# Patient Record
Sex: FEMALE | Race: BLACK OR AFRICAN AMERICAN
[De-identification: names, ages, dates, MRNs, and addresses within clinical notes are randomized per-mention and may not be internally consistent; named-entity substitution may affect disease eponyms.]

---

## 2017-05-04 ENCOUNTER — HOSPITAL ENCOUNTER (OUTPATIENT)
Dept: HOSPITAL 61 - KCIC | Age: 57
Discharge: HOME | End: 2017-05-04
Attending: INTERNAL MEDICINE
Payer: COMMERCIAL

## 2017-05-04 DIAGNOSIS — M25.572: Primary | ICD-10-CM

## 2017-05-04 PROCEDURE — 73610 X-RAY EXAM OF ANKLE: CPT

## 2017-05-04 PROCEDURE — 73630 X-RAY EXAM OF FOOT: CPT

## 2017-05-04 NOTE — [facility]
PROCEDURE 

Left ankle, three views 

 

Left foot, three views 

 

HISTORY 

Left foot and ankle pain, no known injury. 

 

COMPARISON 

None. 

 

FINDINGS 

Ankle mortise is intact. No soft tissue swelling is seen. No joint 

effusion. Mineralization appears normal. No acute fracture. 

No soft tissue swelling of the foot. No acute fracture or dislocation of 

the foot. Minimal joint space narrowing 1st MTP. No bony erosion. 

 

IMPRESSION 

No acute bone abnormality. 

 

 

Electronically signed by: Marvin Longoria MD (May 04, 2017 11:29:15)

## 2018-12-13 ENCOUNTER — HOSPITAL ENCOUNTER (EMERGENCY)
Dept: HOSPITAL 61 - ER | Age: 58
Discharge: HOME | End: 2018-12-13
Payer: COMMERCIAL

## 2018-12-13 VITALS — BODY MASS INDEX: 29.64 KG/M2 | WEIGHT: 147 LBS | HEIGHT: 59 IN

## 2018-12-13 VITALS — SYSTOLIC BLOOD PRESSURE: 144 MMHG | DIASTOLIC BLOOD PRESSURE: 82 MMHG

## 2018-12-13 DIAGNOSIS — R11.2: Primary | ICD-10-CM

## 2018-12-13 DIAGNOSIS — F43.10: ICD-10-CM

## 2018-12-13 DIAGNOSIS — I10: ICD-10-CM

## 2018-12-13 DIAGNOSIS — R10.13: ICD-10-CM

## 2018-12-13 DIAGNOSIS — R51: ICD-10-CM

## 2018-12-13 DIAGNOSIS — R19.7: ICD-10-CM

## 2018-12-13 DIAGNOSIS — F17.210: ICD-10-CM

## 2018-12-13 LAB
ALBUMIN SERPL-MCNC: 4.2 G/DL (ref 3.4–5)
ALBUMIN/GLOB SERPL: 1 {RATIO} (ref 1–1.7)
ALP SERPL-CCNC: 89 U/L (ref 46–116)
ALT SERPL-CCNC: 32 U/L (ref 14–59)
ANION GAP SERPL CALC-SCNC: 11 MMOL/L (ref 6–14)
APTT PPP: (no result) S
AST SERPL-CCNC: 25 U/L (ref 15–37)
BACTERIA #/AREA URNS HPF: 0 /HPF
BASOPHILS # BLD AUTO: 0.1 X10^3/UL (ref 0–0.2)
BASOPHILS NFR BLD: 1 % (ref 0–3)
BILIRUB SERPL-MCNC: 0.6 MG/DL (ref 0.2–1)
BILIRUB UR QL STRIP: (no result)
BUN SERPL-MCNC: 26 MG/DL (ref 7–20)
BUN/CREAT SERPL: 20 (ref 6–20)
CALCIUM SERPL-MCNC: 9.6 MG/DL (ref 8.5–10.1)
CHLORIDE SERPL-SCNC: 101 MMOL/L (ref 98–107)
CO2 SERPL-SCNC: 25 MMOL/L (ref 21–32)
CREAT SERPL-MCNC: 1.3 MG/DL (ref 0.6–1)
EOSINOPHIL NFR BLD: 0.5 X10^3/UL (ref 0–0.7)
EOSINOPHIL NFR BLD: 5 % (ref 0–3)
ERYTHROCYTE [DISTWIDTH] IN BLOOD BY AUTOMATED COUNT: 15.8 % (ref 11.5–14.5)
FIBRINOGEN PPP-MCNC: CLEAR MG/DL
GFR SERPLBLD BASED ON 1.73 SQ M-ARVRAT: 50.7 ML/MIN
GLOBULIN SER-MCNC: 4.4 G/DL (ref 2.2–3.8)
GLUCOSE SERPL-MCNC: 111 MG/DL (ref 70–99)
HCT VFR BLD CALC: 42.5 % (ref 36–47)
HGB BLD-MCNC: 14.3 G/DL (ref 12–15.5)
LIPASE: 61 U/L (ref 73–393)
LYMPHOCYTES # BLD: 1.3 X10^3/UL (ref 1–4.8)
LYMPHOCYTES NFR BLD AUTO: 13 % (ref 24–48)
MCH RBC QN AUTO: 25 PG (ref 25–35)
MCHC RBC AUTO-ENTMCNC: 34 G/DL (ref 31–37)
MCV RBC AUTO: 73 FL (ref 79–100)
MONO #: 0.6 X10^3/UL (ref 0–1.1)
MONOCYTES NFR BLD: 6 % (ref 0–9)
NEUT #: 7.5 X10^3UL (ref 1.8–7.7)
NEUTROPHILS NFR BLD AUTO: 75 % (ref 31–73)
NITRITE UR QL STRIP: NEGATIVE
PH UR STRIP: 5 [PH]
PLATELET # BLD AUTO: 360 X10^3/UL (ref 140–400)
POTASSIUM SERPL-SCNC: 3.8 MMOL/L (ref 3.5–5.1)
PROT SERPL-MCNC: 8.6 G/DL (ref 6.4–8.2)
PROT UR STRIP-MCNC: NEGATIVE MG/DL
RBC # BLD AUTO: 5.84 X10^6/UL (ref 3.5–5.4)
RBC #/AREA URNS HPF: 0 /HPF (ref 0–2)
SODIUM SERPL-SCNC: 137 MMOL/L (ref 136–145)
SQUAMOUS #/AREA URNS LPF: (no result) /LPF
UROBILINOGEN UR-MCNC: 0.2 MG/DL
WBC # BLD AUTO: 10 X10^3/UL (ref 4–11)
WBC #/AREA URNS HPF: 0 /HPF (ref 0–4)

## 2018-12-13 PROCEDURE — 36415 COLL VENOUS BLD VENIPUNCTURE: CPT

## 2018-12-13 PROCEDURE — 85025 COMPLETE CBC W/AUTO DIFF WBC: CPT

## 2018-12-13 PROCEDURE — 96361 HYDRATE IV INFUSION ADD-ON: CPT

## 2018-12-13 PROCEDURE — 80053 COMPREHEN METABOLIC PANEL: CPT

## 2018-12-13 PROCEDURE — 70450 CT HEAD/BRAIN W/O DYE: CPT

## 2018-12-13 PROCEDURE — 96374 THER/PROPH/DIAG INJ IV PUSH: CPT

## 2018-12-13 PROCEDURE — 99284 EMERGENCY DEPT VISIT MOD MDM: CPT

## 2018-12-13 PROCEDURE — 96375 TX/PRO/DX INJ NEW DRUG ADDON: CPT

## 2018-12-13 PROCEDURE — 83690 ASSAY OF LIPASE: CPT

## 2018-12-13 PROCEDURE — 93005 ELECTROCARDIOGRAM TRACING: CPT

## 2018-12-13 PROCEDURE — 84484 ASSAY OF TROPONIN QUANT: CPT

## 2018-12-13 PROCEDURE — 81001 URINALYSIS AUTO W/SCOPE: CPT

## 2018-12-13 NOTE — RAD
PQRS Compliance statement:

 

One or more of the following individualized dose reduction techniques were

utilized for this examination:

1. Automated exposure control.

2. Adjustment of the mA and/or kV according to patient size.

3. Use of iterative reconstruction technique.

 

Indication:HA X 1 MONTH, NO PRIORS

 

TECHNIQUE: CT head without IV contrast

 

COMPARISON:None

 

FINDINGS:

No pathologic extra-axial or intra-axial fluid collection. The ventricles 

and basal cisterns are within normal limits. No acute intracranial bleed. 

No focal loss of gray-white differentiation. Orbits within normal limits. 

No suspicious calvarial lesion. Visualized paranasal sinuses and mastoid 

air cells are clear.

 

IMPRESSION: No acute intracranial process.

 

Electronically signed by: Christ Estrada DO (12/13/2018 12:30 PM) [facility]

## 2018-12-13 NOTE — EKG
[facility]

              [address] [place] [address]

Test Date:    2018               Test Time:    11:12:51

Pat Name:     DOMINICK ROJAS            Department:   

Patient ID:   [id number]           Room:          

Gender:       F                        Technician:   

:          1960               Requested By: ALO ARIZMENDI

Order Number: [id number]           Reading MD:     

                                 Measurements

Intervals                              Axis          

Rate:         91                       P:            64

AR:           160                      QRS:          36

QRSD:         88                       T:            27

QT:           364                                    

QTc:          449                                    

                           Interpretive Statements

SINUS RHYTHM

QRS(T) CONTOUR ABNORMALITY

CONSIDER ANTEROSEPTAL MYOCARDIAL DAMAGE

POSSIBLY ABNORMAL ECG

RI6.01

No previous ECG available for comparison

## 2018-12-13 NOTE — PHYS DOC
Past Medical History


Past Medical History:  Anxiety, Depression, Hypertension


Additional Past Medical Histor:  PTSD


Additional Past Surgical Histo:  TUBAL PREGNANCIES X 2


Additional Information:  


8-9 CIGS/DAY


Alcohol Use:  None


Drug Use:  None





Adult General


Chief Complaint


Chief Complaint:  ABDOMINAL PAIN





HPI


HPI





Patient is a 58  year old female who presents with nausea, vomiting, diarrhea 

and aching epigastric abd. Patient reports that 1-2 hours after lunch yesterday

, which she ate stew, she began to experience these Sx. These Sx have been 

persistent since onset. She has been unable to eat and only drank a small bit 

coffee today for fluids. She denies hematemesis and hematochezia. She has never 

experienced this pain in the past. PSHx of 2 tubal pregnancies in 2003 and 

appendectomy when a child. She is a 1/2 PPD smoker. Denies EtOH and 

recreational drugs. Denies recent travel or sick contacts. Denies dysuria, cough

, CP, fevers, and chills. 





In addition patient has had 3 weeks of a fairly severe headache worsening the 

morning before she gets up from bed. She says it is frontal throbbing slowly 

getting worse no numbness tingling no visual changes.





Review of Systems


Review of Systems





Constitutional: Denies fever or chills []


Eyes: Denies change in visual acuity, redness, or eye pain []


HENT: Denies nasal congestion or sore throat []


Respiratory: Denies cough or shortness of breath []


Cardiovascular: No additional information not addressed in HPI []


GI: + abd pain, + nausea, + vomiting, + diarrhea. Denies hematemesis and 

hematochezia. 


: Denies dysuria or hematuria []


Musculoskeletal: Denies back pain or joint pain []


Integument: Denies rash or skin lesions []


Neurologic: Denies headache, focal weakness or sensory changes []


Endocrine: Denies polyuria or polydipsia []





All other systems were reviewed and found to be within normal limits, except as 

documented in this note.





Current Medications


Current Medications





Current Medications








 Medications


  (Trade)  Dose


 Ordered  Sig/Dominga  Start Time


 Stop Time Status Last Admin


Dose Admin


 


 Fentanyl Citrate


  (Fentanyl 2ml


 Vial)  50 mcg  1X  ONCE  12/13/18 11:00


 12/13/18 11:14 DC 12/13/18 11:22


50 MCG


 


 Ondansetron HCl


  (Zofran)  4 mg  1X  ONCE  12/13/18 11:00


 12/13/18 11:14 DC 12/13/18 11:22


4 MG


 


 Prochlorperazine


 Edisylate


  (Compazine)  10 mg  1X  ONCE  12/13/18 11:45


 12/13/18 11:47 DC 12/13/18 12:35


10 MG


 


 Sodium Chloride  1,000 ml @ 


 1,000 mls/hr  1X  ONCE  12/13/18 11:00


 12/13/18 11:59 DC 12/13/18 11:23


1,000 MLS/HR











Allergies


Allergies





Allergies








Coded Allergies Type Severity Reaction Last Updated Verified


 


  No Known Drug Allergies    12/13/18 No











Physical Exam


Physical Exam





Constitutional: Well developed, well nourished, no acute distress, non-toxic 

appearance. []


HENT: Normocephalic, atraumatic, bilateral external ears normal, oropharynx 

moist, no oral exudates, nose normal. []


Eyes: PERRLA, EOMI, conjunctiva normal, no discharge. [] 


Neck: Normal range of motion, no tenderness, supple, no stridor. [] 


Cardiovascular:Heart rate regular rhythm, no murmur []


Lungs & Thorax:  Bilateral breath sounds clear to auscultation []


Abdomen: Mild diffuse abd TTP that is worse in the epigastrium. No rebound, 

rigidity or guarding. Bowel sounds normal, soft, no masses, no pulsatile 

masses. 


Skin: Warm, dry, no erythema, no rash. [] 


Back: No tenderness, no CVA tenderness. [] 


Extremities: No tenderness, no cyanosis, no clubbing, ROM intact, no edema. [] 


Neurologic: Alert and oriented X 3, normal motor function, normal sensory 

function, no focal deficits noted. []


Psychologic: Affect normal, judgement normal, mood normal. []





Current Patient Data


Vital Signs





 Vital Signs








  Date Time  Temp Pulse Resp B/P (MAP) Pulse Ox O2 Delivery O2 Flow Rate FiO2


 


12/13/18 12:30  80  144/82 (102) 95 Room Air  


 


12/13/18 11:22   18     


 


12/13/18 10:22 98.8       





 98.8       








Lab Values





 Laboratory Tests








Test


 12/13/18


09:47


 


White Blood Count


 10.0 x10^3/uL


(4.0-11.0)


 


Red Blood Count


 5.84 x10^6/uL


(3.50-5.40)  H


 


Hemoglobin


 14.3 g/dL


(12.0-15.5)


 


Hematocrit


 42.5 %


(36.0-47.0)


 


Mean Corpuscular Volume


 73 fL ()


L


 


Mean Corpuscular Hemoglobin 25 pg (25-35)  


 


Mean Corpuscular Hemoglobin


Concent 34 g/dL


(31-37)


 


Red Cell Distribution Width


 15.8 %


(11.5-14.5)  H


 


Platelet Count


 360 x10^3/uL


(140-400)


 


Neutrophils (%) (Auto) 75 % (31-73)  H


 


Lymphocytes (%) (Auto) 13 % (24-48)  L


 


Monocytes (%) (Auto) 6 % (0-9)  


 


Eosinophils (%) (Auto) 5 % (0-3)  H


 


Basophils (%) (Auto) 1 % (0-3)  


 


Neutrophils # (Auto)


 7.5 x10^3uL


(1.8-7.7)


 


Lymphocytes # (Auto)


 1.3 x10^3/uL


(1.0-4.8)


 


Monocytes # (Auto)


 0.6 x10^3/uL


(0.0-1.1)


 


Eosinophils # (Auto)


 0.5 x10^3/uL


(0.0-0.7)


 


Basophils # (Auto)


 0.1 x10^3/uL


(0.0-0.2)


 


Urine Color Leny  


 


Urine Clarity Clear  


 


Urine pH 5.0  


 


Urine Specific Gravity 1.025  


 


Urine Protein


 Negative mg/dL


(NEG-TRACE)


 


Urine Glucose (UA)


 Negative mg/dL


(NEG)


 


Urine Ketones (Stick)


 Negative mg/dL


(NEG)


 


Urine Blood


 Negative (NEG)





 


Urine Nitrite


 Negative (NEG)





 


Urine Bilirubin Small (NEG)  


 


Urine Urobilinogen Dipstick


 0.2 mg/dL (0.2


mg/dL)


 


Urine Leukocyte Esterase


 Negative (NEG)





 


Urine RBC 0 /HPF (0-2)  


 


Urine WBC 0 /HPF (0-4)  


 


Urine Squamous Epithelial


Cells Few /LPF  





 


Urine Bacteria


 0 /HPF (0-FEW)





 


Sodium Level


 137 mmol/L


(136-145)


 


Potassium Level


 3.8 mmol/L


(3.5-5.1)


 


Chloride Level


 101 mmol/L


()


 


Carbon Dioxide Level


 25 mmol/L


(21-32)


 


Anion Gap 11 (6-14)  


 


Blood Urea Nitrogen


 26 mg/dL


(7-20)  H


 


Creatinine


 1.3 mg/dL


(0.6-1.0)  H


 


Estimated GFR


(Cockcroft-Gault) 50.7  





 


BUN/Creatinine Ratio 20 (6-20)  


 


Glucose Level


 111 mg/dL


(70-99)  H


 


Calcium Level


 9.6 mg/dL


(8.5-10.1)


 


Total Bilirubin


 0.6 mg/dL


(0.2-1.0)


 


Aspartate Amino Transferase


(AST) 25 U/L (15-37)





 


Alanine Aminotransferase (ALT)


 32 U/L (14-59)





 


Alkaline Phosphatase


 89 U/L


()


 


Troponin I Quantitative


 < 0.017 ng/mL


(0.000-0.055)


 


Total Protein


 8.6 g/dL


(6.4-8.2)  H


 


Albumin


 4.2 g/dL


(3.4-5.0)


 


Albumin/Globulin Ratio 1.0 (1.0-1.7)  


 


Lipase


 61 U/L


()  L





 Laboratory Tests


12/13/18 09:47








 Laboratory Tests


12/13/18 09:47











EKG


EKG


[]


Interpretation Time:


EKG shows a normal sinus rhythm at a rate of 91 there is probably LVH pattern 

there is nonspecific ST changes lateral no definite ST elevation was 

identified. Interpreted by me time of encounter





Radiology/Procedures


Radiology/Procedures


[]


Impressions:





Indication:HA X 1 MONTH, NO PRIORS


 


TECHNIQUE: CT head without IV contrast


 


COMPARISON:None


 


FINDINGS:


No pathologic extra-axial or intra-axial fluid collection. The ventricles 


and basal cisterns are within normal limits. No acute intracranial bleed. 


No focal loss of gray-white differentiation. Orbits within normal limits. 


No suspicious calvarial lesion. Visualized paranasal sinuses and mastoid 


air cells are clear.


 


IMPRESSION: No acute intracranial process.


 


Electronically signed by: Christ Ballard DO (12/13/2018 12:30 PM) [facility]














Course & Med Decision Making


Course & Med Decision Making


Pertinent Labs and Imaging studies reviewed. (See chart for details)





Assessment:


59 y/o female presents with nausea, vomiting, diarrhea and abd pain








Plan:


IV fluids


pain and nausea control


labs


UA


EKG








ER workup was essentially negative labs looked good she feels better after the 

above treatment CT head was performed due to positional headache for almost one 

month given her age that was negative. Patient was feeling better and was given 

a prescription for nausea medicine I suspect a viral etiology of her symptoms 

but she was given good return precautions. Her abdominal examination on 

reevaluation was benign nontender.





Dragon Disclaimer


Dragon Disclaimer


This electronic medical record was generated, in whole or in part, using a 

voice recognition dictation system.





Departure


Departure


Impression:  


 Primary Impression:  


 Nausea vomiting and diarrhea


Disposition:  01 HOME, SELF-CARE


Condition:  IMPROVED


Referrals:  


LAZARO BALLARD MD (PCP)


Scripts


Prochlorperazine Maleate (Compazine) 10 Mg Tablet


10 MG PO TID PRN for NAUSEA/VOMITING, #30 TAB


   Prov: ALO ARIZMENDI MD         12/13/18











ALO ARIZMENDI MD Dec 13, 2018 10:57

## 2021-03-28 ENCOUNTER — HOSPITAL ENCOUNTER (EMERGENCY)
Dept: HOSPITAL 61 - ER | Age: 61
Discharge: HOME | End: 2021-03-28
Payer: SELF-PAY

## 2021-03-28 VITALS — DIASTOLIC BLOOD PRESSURE: 92 MMHG | SYSTOLIC BLOOD PRESSURE: 179 MMHG

## 2021-03-28 VITALS — BODY MASS INDEX: 29.78 KG/M2 | WEIGHT: 147.71 LBS | HEIGHT: 59 IN

## 2021-03-28 DIAGNOSIS — F17.200: ICD-10-CM

## 2021-03-28 DIAGNOSIS — J02.9: Primary | ICD-10-CM

## 2021-03-28 DIAGNOSIS — F41.9: ICD-10-CM

## 2021-03-28 DIAGNOSIS — I10: ICD-10-CM

## 2021-03-28 DIAGNOSIS — Z98.890: ICD-10-CM

## 2021-03-28 DIAGNOSIS — R05: ICD-10-CM

## 2021-03-28 DIAGNOSIS — Z20.822: ICD-10-CM

## 2021-03-28 DIAGNOSIS — F32.9: ICD-10-CM

## 2021-03-28 LAB
% BANDS: 1 % (ref 0–9)
% LYMPHS: 30 % (ref 24–48)
% MONOS: 6 % (ref 0–10)
% SEGS: 56 % (ref 35–66)
ALBUMIN SERPL-MCNC: 4.3 G/DL (ref 3.4–5)
ALBUMIN/GLOB SERPL: 1 {RATIO} (ref 1–1.7)
ALP SERPL-CCNC: 100 U/L (ref 46–116)
ALT SERPL-CCNC: 14 U/L (ref 14–59)
ANION GAP SERPL CALC-SCNC: 11 MMOL/L (ref 6–14)
APTT PPP: YELLOW S
AST SERPL-CCNC: 12 U/L (ref 15–37)
BACTERIA #/AREA URNS HPF: 0 /HPF
BASOPHILS # BLD AUTO: 0.1 X10^3/UL (ref 0–0.2)
BASOPHILS NFR BLD AUTO: 1 % (ref 0–3)
BASOPHILS NFR BLD: 1 % (ref 0–3)
BILIRUB SERPL-MCNC: 0.7 MG/DL (ref 0.2–1)
BILIRUB UR QL STRIP: NEGATIVE
BUN SERPL-MCNC: 10 MG/DL (ref 7–20)
BUN/CREAT SERPL: 11 (ref 6–20)
CALCIUM SERPL-MCNC: 8.9 MG/DL (ref 8.5–10.1)
CHLORIDE SERPL-SCNC: 103 MMOL/L (ref 98–107)
CO2 SERPL-SCNC: 25 MMOL/L (ref 21–32)
CREAT SERPL-MCNC: 0.9 MG/DL (ref 0.6–1)
EOSINOPHIL NFR BLD AUTO: 6 % (ref 0–5)
EOSINOPHIL NFR BLD: 1.3 X10^3/UL (ref 0–0.7)
EOSINOPHIL NFR BLD: 9 % (ref 0–3)
ERYTHROCYTE [DISTWIDTH] IN BLOOD BY AUTOMATED COUNT: 15.1 % (ref 11.5–14.5)
FIBRINOGEN PPP-MCNC: CLEAR MG/DL
GFR SERPLBLD BASED ON 1.73 SQ M-ARVRAT: 77 ML/MIN
GLUCOSE SERPL-MCNC: 84 MG/DL (ref 70–99)
HCT VFR BLD CALC: 43.1 % (ref 36–47)
HGB BLD-MCNC: 14.1 G/DL (ref 12–15.5)
LYMPHOCYTES # BLD: 3.5 X10^3/UL (ref 1–4.8)
LYMPHOCYTES NFR BLD AUTO: 25 % (ref 24–48)
MAGNESIUM SERPL-MCNC: 2.2 MG/DL (ref 1.8–2.4)
MCH RBC QN AUTO: 24 PG (ref 25–35)
MCHC RBC AUTO-ENTMCNC: 33 G/DL (ref 31–37)
MCV RBC AUTO: 75 FL (ref 79–100)
MONO #: 0.8 X10^3/UL (ref 0–1.1)
MONOCYTES NFR BLD: 6 % (ref 0–9)
NEUT #: 8.3 X10^3/UL (ref 1.8–7.7)
NEUTROPHILS NFR BLD AUTO: 59 % (ref 31–73)
NITRITE UR QL STRIP: NEGATIVE
PH UR STRIP: 7 [PH]
PLATELET # BLD AUTO: 379 X10^3/UL (ref 140–400)
PLATELET # BLD EST: ADEQUATE 10*3/UL
POTASSIUM SERPL-SCNC: 3.7 MMOL/L (ref 3.5–5.1)
PROT SERPL-MCNC: 8.6 G/DL (ref 6.4–8.2)
PROT UR STRIP-MCNC: NEGATIVE MG/DL
RBC # BLD AUTO: 5.78 X10^6/UL (ref 3.5–5.4)
RBC #/AREA URNS HPF: (no result) /HPF (ref 0–2)
SODIUM SERPL-SCNC: 139 MMOL/L (ref 136–145)
UROBILINOGEN UR-MCNC: 0.2 MG/DL
WBC # BLD AUTO: 14.1 X10^3/UL (ref 4–11)
WBC #/AREA URNS HPF: 0 /HPF (ref 0–4)

## 2021-03-28 PROCEDURE — 80053 COMPREHEN METABOLIC PANEL: CPT

## 2021-03-28 PROCEDURE — 96376 TX/PRO/DX INJ SAME DRUG ADON: CPT

## 2021-03-28 PROCEDURE — 84484 ASSAY OF TROPONIN QUANT: CPT

## 2021-03-28 PROCEDURE — C9803 HOPD COVID-19 SPEC COLLECT: HCPCS

## 2021-03-28 PROCEDURE — 36415 COLL VENOUS BLD VENIPUNCTURE: CPT

## 2021-03-28 PROCEDURE — 93005 ELECTROCARDIOGRAM TRACING: CPT

## 2021-03-28 PROCEDURE — 96375 TX/PRO/DX INJ NEW DRUG ADDON: CPT

## 2021-03-28 PROCEDURE — 71045 X-RAY EXAM CHEST 1 VIEW: CPT

## 2021-03-28 PROCEDURE — 87880 STREP A ASSAY W/OPTIC: CPT

## 2021-03-28 PROCEDURE — 96374 THER/PROPH/DIAG INJ IV PUSH: CPT

## 2021-03-28 PROCEDURE — 81001 URINALYSIS AUTO W/SCOPE: CPT

## 2021-03-28 PROCEDURE — U0003 INFECTIOUS AGENT DETECTION BY NUCLEIC ACID (DNA OR RNA); SEVERE ACUTE RESPIRATORY SYNDROME CORONAVIRUS 2 (SARS-COV-2) (CORONAVIRUS DISEASE [COVID-19]), AMPLIFIED PROBE TECHNIQUE, MAKING USE OF HIGH THROUGHPUT TECHNOLOGIES AS DESCRIBED BY CMS-2020-01-R: HCPCS

## 2021-03-28 PROCEDURE — 85007 BL SMEAR W/DIFF WBC COUNT: CPT

## 2021-03-28 PROCEDURE — 85025 COMPLETE CBC W/AUTO DIFF WBC: CPT

## 2021-03-28 PROCEDURE — 70491 CT SOFT TISSUE NECK W/DYE: CPT

## 2021-03-28 PROCEDURE — 99285 EMERGENCY DEPT VISIT HI MDM: CPT

## 2021-03-28 PROCEDURE — 83735 ASSAY OF MAGNESIUM: CPT

## 2021-03-28 PROCEDURE — 87070 CULTURE OTHR SPECIMN AEROBIC: CPT

## 2021-03-28 NOTE — RAD
Exam Date:  3/28/2021 12:10 PM



XR CHEST 1V



Indication: Reason: cough,fever / Spl. Instructions:  / History:  







FINDINGS/

IMPRESSION:





The cardiac silhouette and pulmonary vasculature are within normal limits.  

There is no focal consolidation, pleural effusion or pneumothorax.  

The visualized osseous structures are intact.





Electronically signed by: Santy Buatista MD (3/28/2021 12:50 PM) [facility]

## 2021-03-28 NOTE — EKG
[facility]

              [address] [place] [address]

Test Date:    2021               Test Time:    11:23:09

Pat Name:     DOMINICK ROJAS            Department:   

Patient ID:   [id number]           Room:          

Gender:       F                        Technician:   

:          1960               Requested By: JOSE NAZARIO

Order Number: [id number]           Reading MD:     

                                 Measurements

Intervals                              Axis          

Rate:         75                       P:            57

AR:           160                      QRS:          32

QRSD:         88                       T:            5

QT:           400                                    

QTc:          449                                    

                           Interpretive Statements

SINUS RHYTHM

LEFT ATRIAL ABNORMALITY

ABNORMAL ECG

RI6.02

No previous ECG available for comparison

## 2021-03-28 NOTE — PHYS DOC
Past Medical History


Past Medical History:  Anxiety, Depression, Hypertension


Additional Past Medical Histor:  PTSD


Additional Past Surgical Histo:  TUBAL PREGNANCIES X 2


Smoking Status:  Current Every Day Smoker


Alcohol Use:  None


Drug Use:  None





General Adult


EDM:


Chief Complaint:  SORE THROAT





HPI:


HPI:





Patient is a 60  year old female who who presented to ER due to sore throat 

since yesterday.  Patient had nonproductive cough.  Patient denies any fever, no

nausea vomiting.  Patient denies any headache, no neck pain, no abdominal pain, 

no nausea vomiting.  Patient denies any COVID-19 exposure, patient had not had 

Covid vaccine.  Patient has history of hypertension, she had not taken her 

morning medication.  Patient denies any blurred vision, no neck stiffness.





Review of Systems:


Review of Systems:


Constitutional:   Denies fever or chills. []


Eyes:   Denies change in visual acuity. []


HENT:   Positive for sore throat,] 


Respiratory:   Positive for mild  cough, no shortness of breath. [] 


Cardiovascular:   Denies chest pain or edema. [] 


GI:   Denies abdominal pain, nausea, vomiting, bloody stools or diarrhea. [] 


:  Denies dysuria. [] 


Musculoskeletal:   Denies back pain or joint pain. [] 


Integument:   Denies rash. [] 


Neurologic:   Denies headache, focal weakness or sensory changes. [] 


Endocrine:   Denies polyuria or polydipsia. [] 


Lymphatic:  Denies swollen glands. [] 


Psychiatric:  Denies depression or anxiety. []





Heart Score:


C/O Chest Pain:  N/A


Risk Factors:


Risk Factors:  DM, Current or recent (<one month) smoker, HTN, HLP, family 

history of CAD, obesity.


Risk Scores:


Score 0 - 3:  2.5% MACE over next 6 weeks - Discharge Home


Score 4 - 6:  20.3% MACE over next 6 weeks - Admit for Clinical Observation


Score 7 - 10:  72.7% MACE over next 6 weeks - Early Invasive Strategies





Current Medications:





Current Medications








 Medications


  (Trade)  Dose


 Ordered  Sig/Dominga  Start Time


 Stop Time Status Last Admin


Dose Admin


 


 Clonidine HCl


  (Catapres)  0.1 mg  1X  ONCE  3/28/21 10:45


 3/28/21 10:46 DC  





 


 Multi-Ingredient


 Mouthwash/Gargle


  (Gi Cocktail)  20 ml  1X  ONCE  3/28/21 11:00


 3/28/21 11:01 UNV  














Allergies:


Allergies:





Allergies








Coded Allergies Type Severity Reaction Last Updated Verified


 


  No Known Drug Allergies    18 No











Physical Exam:


PE:





Constitutional: Well developed, well nourished, no acute distress, non-toxic 

appearance. []


HENT: Normocephalic, atraumatic, bilateral external ears normal, oropharynx 

erythematous, no oral exudates, nose normal. []


Eyes: PERRLA, EOMI, conjunctiva normal, no discharge. [] 


Neck: Normal range of motion, no tenderness, supple, no stridor. [] 


Cardiovascular:Heart rate regular rhythm, no murmur []


Lungs & Thorax:  Bilateral breath sounds clear to auscultation []


Abdomen: Bowel sounds normal, soft, no tenderness, no masses, no pulsatile 

masses. [] 


Skin: Warm, dry, no erythema, no rash. [] 


Back: No tenderness, no CVA tenderness. [] 


Extremities: No tenderness, no cyanosis, no clubbing, ROM intact, no edema. [] 


Neurologic: Alert and oriented X 3, normal motor function, normal sensory 

function, no focal deficits noted. []


Psychologic: Affect normal, judgement normal, mood normal. []





Current Patient Data:


Labs:





Laboratory Tests








Test


 3/28/21


11:10 3/28/21


11:50 3/28/21


12:20


 


White Blood Count 14.1 x10^3/uL   


 


Red Blood Count 5.78 x10^6/uL   


 


Hemoglobin 14.1 g/dL   


 


Hematocrit 43.1 %   


 


Mean Corpuscular Volume 75 fL   


 


Mean Corpuscular Hemoglobin 24 pg   


 


Mean Corpuscular Hemoglobin


Concent 33 g/dL 


 


 





 


Red Cell Distribution Width 15.1 %   


 


Platelet Count 379 x10^3/uL   


 


Neutrophils (%) (Auto) 59 %   


 


Lymphocytes (%) (Auto) 25 %   


 


Monocytes (%) (Auto) 6 %   


 


Eosinophils (%) (Auto) 9 %   


 


Basophils (%) (Auto) 1 %   


 


Neutrophils # (Auto) 8.3 x10^3/uL   


 


Lymphocytes # (Auto) 3.5 x10^3/uL   


 


Monocytes # (Auto) 0.8 x10^3/uL   


 


Eosinophils # (Auto) 1.3 x10^3/uL   


 


Basophils # (Auto) 0.1 x10^3/uL   


 


Segmented Neutrophils % 56 %   


 


Band Neutrophils % 1 %   


 


Lymphocytes % 30 %   


 


Monocytes % 6 %   


 


Eosinophils % 6 %   


 


Basophils % 1 %   


 


Platelet Estimate Adequate   


 


Sodium Level 139 mmol/L   


 


Potassium Level 3.7 mmol/L   


 


Chloride Level 103 mmol/L   


 


Carbon Dioxide Level 25 mmol/L   


 


Anion Gap 11   


 


Blood Urea Nitrogen 10 mg/dL   


 


Creatinine 0.9 mg/dL   


 


Estimated GFR


(Cockcroft-Gault) 77.0 


 


 





 


BUN/Creatinine Ratio 11   


 


Glucose Level 84 mg/dL   


 


Calcium Level 8.9 mg/dL   


 


Magnesium Level 2.2 mg/dL   


 


Total Bilirubin 0.7 mg/dL   


 


Aspartate Amino Transf


(AST/SGOT) 12 U/L 


 


 





 


Alanine Aminotransferase


(ALT/SGPT) 14 U/L 


 


 





 


Alkaline Phosphatase 100 U/L   


 


Troponin I Quantitative < 0.017 ng/mL   


 


Total Protein 8.6 g/dL   


 


Albumin 4.3 g/dL   


 


Albumin/Globulin Ratio 1.0   


 


Group A Streptococcus Rapid  Negative  


 


Urine Collection Type   Unknown 


 


Urine Color   Yellow 


 


Urine Clarity   Clear 


 


Urine pH   7.0 


 


Urine Specific Gravity   1.010 


 


Urine Protein   Negative mg/dL 


 


Urine Glucose (UA)   Negative mg/dL 


 


Urine Ketones (Stick)   Negative mg/dL 


 


Urine Blood   Trace 


 


Urine Nitrite   Negative 


 


Urine Bilirubin   Negative 


 


Urine Urobilinogen Dipstick   0.2 mg/dL 


 


Urine Leukocyte Esterase   Negative 


 


Urine RBC   6-10 /HPF 


 


Urine WBC   0 /HPF 


 


Urine Squamous Epithelial


Cells 


 


 Few /LPF 





 


Urine Bacteria   0 /HPF 








Current Medications








 Medications


  (Trade)  Dose


 Ordered  Sig/Dominga


 Route


 PRN Reason  Start Time


 Stop Time Status Last Admin


Dose Admin


 


 Clonidine HCl


  (Catapres)  0.1 mg  1X  ONCE


 PO


   3/28/21 10:45


 3/28/21 10:46 DC 3/28/21 11:17





 


 Multi-Ingredient


 Mouthwash/Gargle


  (Gi Cocktail)  20 ml  1X  ONCE


 SWSW


   3/28/21 11:00


 3/28/21 11:01 DC 3/28/21 11:18





 


 Morphine Sulfate


  (Morphine


 Sulfate)  5 mg  1X  ONCE


 IV


   3/28/21 12:00


 3/28/21 12:08 DC 3/28/21 12:14





 


 Ceftriaxone Sodium


  (Rocephin)  1 gm  1X  ONCE


 IVP


   3/28/21 12:15


 3/28/21 12:16 DC 3/28/21 12:27





 


 Hydralazine HCl


  (Apresoline Inj)  10 mg  1X  ONCE


 IVP


   3/28/21 12:45


 3/28/21 12:46 DC 3/28/21 13:05





 


 Iohexol


  (Omnipaque 300


 Mg/ml)  70 ml  1X  ONCE


 IV


   3/28/21 13:15


 3/28/21 13:16 DC 3/28/21 13:28





 


 Info


  (CONTRAST GIVEN


 -- Rx MONITORING)  1 each  PRN DAILY  PRN


 MC


 SEE COMMENTS  3/28/21 13:15


 3/30/21 13:14   





 


 Morphine Sulfate


  (Morphine


 Sulfate)  4 mg  1X  ONCE


 IV


   3/28/21 14:00


 3/28/21 14:01 DC 3/28/21 13:58





 


 Labetalol HCl


  (Normodyne Iv


 Push)  20 mg  1X  ONCE


 IVP


   3/28/21 14:00


 3/28/21 14:01 DC 3/28/21 13:58





 


 Ondansetron HCl


  (Zofran)  4 mg  1X  ONCE


 IVP


   3/28/21 14:00


 3/28/21 14:01 DC 3/28/21 14:08














EKG:


EKG:


[]





Radiology/Procedures:


Radiology/Procedures:


[][facility]


                    [address] [place] [address]


                                 [phone]


                                        


                                 IMAGING REPORT





                                     Signed





PATIENT: DOMINICK ROJAS  ACCOUNT: [id number]     MRN#: [id number]


: 1960           LOCATION: ER              AGE: 60


SEX: F                    EXAM DT: 21         ACCESSION#: [id number]


STATUS: REG ER            ORD. PHYSICIAN: JOSE NAZARIO DO


REASON: sorethroat, hurt to swallow since yesterday


PROCEDURE: CT SOFT TISSUE NECK W/CONTRAST





Study: CT neck with contrast





INDICATION: Odynophagia.





COMPARISON: None.





TECHNIQUE: Axial CT imaging of the neck performed after the intravenous 

administration of 70 cc Omnipaque 300 contrast. Coronal and sagittal reformats 

were obtained.





One or more of the following individualized dose reduction techniques were 

utilized for this examination:  





1. Automated exposure control


2. Adjustment of the mA and/or kV according to patient size


3. Use of iterative reconstruction technique.





FINDINGS:





Slight asymmetric prominence of the tonsillar pillar on the right but without a 

tonsillar or peritonsillar abscess. No abnormal thickening of the epiglottis. 

The lingual tonsils are within normal limits as are the adenoids. Maintained 

aeration of the piriform sinuses.





No edema or fluid collection along the floor the mouth. The airway is patent. 

The trachea and mainstem bronchi are unremarkable. The visualized esophagus is 

within normal limits.





No prevertebral edema. Unremarkable thoracic inlet and visualized mediastinal 

contents.





The partially assessed intracranial contents are within normal limits. 

Relatively symmetric size and density of the parotid and submandibular glands. 

No thyroid nodule.





Multiple missing teeth. Several dental caries.





Multilevel cervical spondylosis with central canal narrowing greatest at C5-C6 w

here it is at least moderate. Possible partially imaged 4 mm left upper lobe 

pulmonary nodule, image 100 series 2, though this could be volume averaging with

 a vessel when correlating with the coronal and sagittal reformats.





IMPRESSION:





1.  No infectious/inflammatory process or mass seen along the pharynx to explain

 the patient's symptoms. The right palatine tonsils are slightly larger than the

 left but without a well delineated tonsillar or peritonsillar abscess.


2.  Several chronic findings as described in the body report to include 

multilevel cervical spondylosis with at least moderate central canal stenosis at

 C5-C6.





Electronically signed by: BINA MALIK MD (3/28/2021 2:36 PM) [id number]














DICTATED and SIGNED BY:     BINA MALIK MD


DATE:     21 [id number]








                            [facility]


                    [address] [place] [address]


                                 [phone]


                                        


                                 IMAGING REPORT





                                     Signed





PATIENT: DOMINICK ROJAS  ACCOUNT: [id number]     MRN#: [id number]


: 1960           LOCATION: ER              AGE: 60


SEX: F                    EXAM DT: 21         ACCESSION#: [id number]


STATUS: REG ER            ORD. PHYSICIAN: JOSE NAZARIO DO


REASON: cough,fever


PROCEDURE: CHEST AP ONLY





Exam Date:  3/28/2021 12:10 PM





XR CHEST 1V





Indication: Reason: cough,fever / Spl. Instructions:  / History:  











FINDINGS/


IMPRESSION:








The cardiac silhouette and pulmonary vasculature are within normal limits.  


There is no focal consolidation, pleural effusion or pneumothorax.  


The visualized osseous structures are intact.








Electronically signed by: Hazel Bautista MD (3/28/2021 12:50 PM) [id number]














DICTATED and SIGNED BY:     HAZEL BAUTISTA MD


DATE:     21 [id number] 0





Course & Med Decision Making:


Course & Med Decision Making


Pertinent Labs and Imaging studies reviewed. (See chart for details)





Patient is a 60-year-old female who presented to ER due to sore throat.  Patient

 was found to have elevated blood pressure.  Patient has history of 

hypertension, she had not taken her blood pressure medication today.  CT scan of

 the neck did not show an abscess.  Strep test came back negative.  COVID-19 

test pending, patient was given IV antibiotic, IV blood pressure medication.  

Patient feel much better.  Chest x-ray did not show any acute process.  Patient 

will be discharged home





Dragon Disclaimer:


Dragon Disclaimer:


This electronic medical record was generated, in whole or in part, using a voice

 recognition dictation system.





Departure


Departure


Impression:  


   Primary Impression:  


   Pharyngitis


   Additional Impressions:  


   Hypertension


   Person under investigation for COVID-19


Disposition:  01 DC HOME SELF CARE/HOMELESS


Condition:  IMPROVED


Referrals:  


LAZARO BALLARD MD (PCP)


Please follow up with your doctor in 2 days for reevaluation


Patient Instructions:  Hypertension, Viral and Bacterial Pharyngitis





Additional Instructions:  


You have been tested for or diagnosed with COVID-19. It is an infection caused 

by a new type 


of coronavirus. COVID-19 will cause cold-like or mild flu symptoms in most. It 

can cause 


more severe symptoms like problems breathing in some.





There is no treatment for COVID-19. The body will clear the infection over time.

 Self-care 


will help to ease discomfort.





Steps to Take:


Self-Care


Rest as needed. Healthy habits may help you feel better. Steps include:





Choose healthy foods including fruits and vegetables. Drink water throughout the

 day.


Get plenty of sleep each night.


If you smoke, try to quit. It may ease breathing.


Avoid alcohol.


Keep Others Healthy


The virus can spread to others. Droplets are released every time you sneeze or 

cough. The 


droplets can get into the mouth, nose, or eyes of people near you and lead to 

infection. To 


lower the chances of spreading COVID-19 to others:





Stay at home until your doctor has said it is safe to leave. If you tested 

positive this 


will mean staying isolated until both of the following are true:





At least 7 days have passed since the start of illness.


You are free of fever for at least 72 hours without the use of medicine.


During this time:





 - Avoid public areas, events, or transportation. Do not return to work or 

school until your 


doctor has said it is safe to do so.


 - Call ahead if you need to go to a medical center. Let them know you may have 

COVID-19. It 


will help them guide you where to go. They may also ask you to wear a facemask 

when you come 


to the office.


 - If you call for emergency medical services, let them know you may have COVID-

19.


While at home:





 - Try to avoid close contact with others. Stay about 6 feet away.


 - If possible, spend most of your time in a separate room from others.


 - Use a face mask if you will be in close contact with others such as sharing a

 room or 


vehicle.


 - Have someone wipe down common surfaces in the home. Use household  

every day on 


areas like doorknobs, counters, or sinks.


 - Cough or sneeze into a tissue. Throw the tissue away right after use. If a 

tissue is not 


available, cough or sneeze into your elbow.


 - Wash your hands often. Wash them after sneezing or coughing. Use soap and 

water and wash 


for at least 20 seconds. Alcohol based hand  can be used if soap and 

water is not 


available.


 - Do not prepare food for others. Avoid sharing personal items like forks, 

spoons, or 


toothbrushes.


 - Avoid close contact with pets while you are sick. There is no evidence of the

 virus 


passing to pets. This is a safety step until more is known about this virus.


Isolation can be frustrating. Social interaction can help. Keep in touch with 

friends and 


family through phone and tech options. You can still interact with others in 

your home, just 


keep a safe distance of about 6 feet.





Follow-up:


Your doctors office will check in with you to see if there are any changes in 

your health. 


You may be asked to keep track of symptoms to share with them. They will also 

let you know 


when you are clear to be in public again.





Problems to Look Out For:


Contact your doctor if your recovery is not going as you expect. Get emergency 

care if you 


have problems such as:





 - Trouble breathing


 - Nonstop chest pain or pressure


 - Changes in awareness, confusion, or problems waking


 - Lips or face have bluish color


 - Worsening of symptoms


If you think you have an emergency, call for emergency medical services right 

away.





As taken from [facility]


Scripts


Amoxicillin/Potassium Clav (AMOX TR-K -125 MG TAB) 1 Each Tablet


1 TAB PO BID, #20 TAB


   Prov: JOSE NAZARIO DO         3/28/21











JOSE NAZARIO DO                Mar 28, 2021 10:57

## 2021-03-28 NOTE — RAD
Study: CT neck with contrast



INDICATION: Odynophagia.



COMPARISON: None.



TECHNIQUE: Axial CT imaging of the neck performed after the intravenous administration of 70 cc Omnip
aque 300 contrast. Coronal and sagittal reformats were obtained.



One or more of the following individualized dose reduction techniques were utilized for this examinat
ion:  



1. Automated exposure control

2. Adjustment of the mA and/or kV according to patient size

3. Use of iterative reconstruction technique.



FINDINGS:



Slight asymmetric prominence of the tonsillar pillar on the right but without a tonsillar or peritons
illar abscess. No abnormal thickening of the epiglottis. The lingual tonsils are within normal limits
 as are the adenoids. Maintained aeration of the piriform sinuses.



No edema or fluid collection along the floor the mouth. The airway is patent. The trachea and mainste
m bronchi are unremarkable. The visualized esophagus is within normal limits.



No prevertebral edema. Unremarkable thoracic inlet and visualized mediastinal contents.



The partially assessed intracranial contents are within normal limits. Relatively symmetric size and 
density of the parotid and submandibular glands. No thyroid nodule.



Multiple missing teeth. Several dental caries.



Multilevel cervical spondylosis with central canal narrowing greatest at C5-C6 where it is at least m
oderate. Possible partially imaged 4 mm left upper lobe pulmonary nodule, image 100 series 2, though 
this could be volume averaging with a vessel when correlating with the coronal and sagittal reformats
.



IMPRESSION:



1.  No infectious/inflammatory process or mass seen along the pharynx to explain the patient's sympto
ms. The right palatine tonsils are slightly larger than the left but without a well delineated tonsil
lar or peritonsillar abscess.

2.  Several chronic findings as described in the body report to include multilevel cervical spondylos
is with at least moderate central canal stenosis at C5-C6.



Electronically signed by: BINA MALIK MD (3/28/2021 2:36 PM) [id number]